# Patient Record
Sex: FEMALE | Race: WHITE
[De-identification: names, ages, dates, MRNs, and addresses within clinical notes are randomized per-mention and may not be internally consistent; named-entity substitution may affect disease eponyms.]

---

## 2017-05-17 ENCOUNTER — HOSPITAL ENCOUNTER (OUTPATIENT)
Dept: HOSPITAL 44 - RT | Age: 77
End: 2017-05-17
Attending: PHYSICIAN ASSISTANT
Payer: MEDICARE

## 2017-05-17 DIAGNOSIS — Z53.9: Primary | ICD-10-CM

## 2018-02-05 ENCOUNTER — HOSPITAL ENCOUNTER (OUTPATIENT)
Dept: HOSPITAL 44 - RAD | Age: 78
End: 2018-02-05
Attending: PHYSICIAN ASSISTANT
Payer: MEDICARE

## 2018-02-05 DIAGNOSIS — R10.32: Primary | ICD-10-CM

## 2018-02-05 PROCEDURE — 74020: CPT

## 2018-02-05 NOTE — DIAGNOSTIC IMAGING REPORT
RODRIGO ACE 

[facility]

[id number] [facility] [address] [address]

 

 

 

 

Report Submission Date: 2018 3:58:03 PM CST

Patient       Study

Name:   DARIO CANTOR       Date:   2018 3:37:12 PM CST

MRN:   [id number]       Modality Type:   CR

Gender:   F       Description:   ABDOMEN

:   42       Institution:   [facility]

Physician:   RODRIGO ACE

     Accession:    [id number]

 

 

Examination: Obstruction series 



History: PT STATES LLQ ABD PAIN X 3 DAYS (Hx) / LLQ PAIN (DICOM Hx) / LLQ PAIN (
Pt comments) 



Findings: 4 views obtained of the abdomen. No abnormal dilation of the large or 
small bowel. Air and stool throughout the large bowel. No suspicious 
calcification projecting over the renal fossa or the lower pelvic region. 
Pelvic phleboliths. Surgical clips right upper quadrant.   Degenerative 
spurring of the osseous structures. 



Impression: No  obstruction. 



No suspicious calcifications by plain film sensitivity.

 

Electronically signed on 2018 3:58:03 PM CST by:

Franky CRUZ